# Patient Record
Sex: MALE
[De-identification: names, ages, dates, MRNs, and addresses within clinical notes are randomized per-mention and may not be internally consistent; named-entity substitution may affect disease eponyms.]

---

## 2023-08-17 ENCOUNTER — NURSE TRIAGE (OUTPATIENT)
Dept: OTHER | Facility: CLINIC | Age: 23
End: 2023-08-17

## 2023-08-17 NOTE — TELEPHONE ENCOUNTER
Location of patient: [place]    Subjective: Caller states \"I had my appendix removed on Monday and now vomiting\"     Current Symptoms: Caller reports appendix removed 4 days ago. Caller has pain at incision site and is vomiting. Caller took pain medication on empty stomach. Vomiting began 2 hours ago    Associated Symptoms: Post op problem    Pain Severity: 6/10    Temperature: 99.7 orally    What has been tried: Oxycodone, antibiotics    Recommended disposition: Home care    Care advice provided, patient verbalizes understanding; denies any other questions or concerns. Outcome: Advised when to follow up with surgeon or when to go to ED. Will try and eat something to see how he feels. He is also due to take pain medication.  Will take after eating something      This triage is a result of a call to the [facility]      Reason for Disposition   [1] Vomiting AND [2] present < 4 hours    Protocols used: Post-Op Symptoms and Questions-ADULT-